# Patient Record
Sex: FEMALE | Race: WHITE | NOT HISPANIC OR LATINO | Employment: FULL TIME | URBAN - METROPOLITAN AREA
[De-identification: names, ages, dates, MRNs, and addresses within clinical notes are randomized per-mention and may not be internally consistent; named-entity substitution may affect disease eponyms.]

---
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
## 2022-01-10 ENCOUNTER — HISTORICAL (OUTPATIENT)
Dept: ADMINISTRATIVE | Facility: HOSPITAL | Age: 22
End: 2022-01-10

## 2022-05-02 NOTE — HISTORICAL [facility]
This is a historical note converted from Alexey. Formatting and pictures may have been removed.  Please reference Alexey for original formatting and attached multimedia. Chief Complaint  3 MONTH F/U LEFT SI ?SCREW FIXATION, AMBULATING WITHOUT ASSISTANCE, NO COMPLAINTS  History of Present Illness  ?  Patient is here today now 3 months status post left sacroiliac screw fixation?for multiple pelvic fractures with unstable disruption of pelvic [place].? She has been ambulating without any assistive devices. ?She is feeling much better. ?She has occasional pain?in her left hip?with stiffness but?this is improving.? She is ready for?return to work.  ?  Review of Systems  ?  Otherwise negative  ?  Physical Exam  Vitals & Measurements  HR:?90(Peripheral)? RR:?20? BP:?124/80?  HT:?154.90?cm? WT:?46.500?kg? BMI:?19.38? LMP:?06/28/2021 00:00 CDT?  Bilateral lower extremities:?No pain with THERESA testing?bilateral lower extremities.? Surgical incisions are well-healed.? Full range of motion of the hip, knee, ankle and digits bilaterally. ?Equal and symmetric pulses?with 5 out of 5 motor strength dorsiflexion plantarflexion of ankle and digits.? No tenderness to palpation across anterior pelvic ring.  Assessment/Plan  Multiple pelvic fractures?S32.82XA  Ordered:  Clinic Follow up, *Est. 10/07/21 3:00:00 CDT, Order for future visit, Multiple pelvic fractures, [facility]  Post-Op follow-up visit [id number] PC, Multiple pelvic fractures, [facility], 07/07/21 14:37:00 CDT  XR Pelvis Minimum 3 Views, Routine, *Est. 10/07/21 3:00:00 CDT, None, Ambulatory, Rad Type, Order for future visit, Multiple pelvic fractures, Not Scheduled, *Est. 10/07/21 3:00:00 CDT  ?  ?  She is doing very well today and I am very happy with her progress. ?We will provide her with a return to work full?duty without restrictions starting on 7/21. ?She can continue full weightbearing bilateral lower extremities. ?I continue to have her work on  strengthening and range of motion exercises to improve her stamina?and gait. ?I will see her back in 3 months?for final x-rays of her pelvic ring.? She is happy with this plan of care and all questions and concerns were addressed.  ?  Referrals  Clinic Follow up, *Est. 10/07/21 3:00:00 CDT, Order for future visit, Multiple pelvic fractures, [facility]   Problem List/Past Medical History  Ongoing  No chronic problems  Historical  No qualifying data  Procedure/Surgical History  ORIF Sacroiliac Joint (.) (04/11/2021)  Reposition Left Pelvic Bone with Internal Fixation Device, Open Approach (04/11/2021)  Reposition Right Pelvic Bone with Internal Fixation Device, Open Approach (04/11/2021)  none   Medications  acetaminophen-oxycodone 325 mg-5 mg oral tablet, 1 tab(s), Oral, q4hr  Albuterol (Eqv-ProAir HFA) 90 mcg/inh inhalation aerosol, 2 puff(s), INH, q6hr  alPRAzolam 0.5 mg oral tablet, 0.5 mg= 1 tab(s), Oral, TID  aspirin 81 mg oral Delayed Release (EC) tablet, 81 mg= 1 tab(s), Oral, BID  azithromycin 500 mg oral tablet, 500 mg= 1 tab(s), Oral, Daily  bisacodyl 10 mg RECTAL suppository, 10 mg= 1 supp, PA (rectal), Daily, PRN  docusate sodium 100 mg oral capsule, 100 mg= 1 cap(s), Oral, BID  ethinyl estradiol-norelgestromin 35 mcg-150 mcg/24 hr transdermal film, extended release, 1 patch(es), TD, As Directed  fludrocortisone 0.1 mg oral tablet, 0.1 mg= 1 tab(s), Oral, Daily  gabapentin 300 mg oral capsule, 300 mg= 1 cap(s), Oral, TID  hydrOXYzine hydrochloride 25 mg oral tablet, 25 mg= 1 tab(s), Oral, QID  JUNEL FE TAB 1/20  Lexapro 10 mg oral tablet, 10 mg= 1 tab(s), Oral, Daily  methocarbamol 500 mg oral tablet, 500 mg= 1 tab(s), Oral, q8hr,? ?Not Taking, Completed Rx  metoprolol succinate 100 mg oral tablet, extended release, 100 mg= 1 tab(s), Oral, Daily  metoprolol succinate 50 mg oral tablet, extended release, 50 mg= 1 tab(s), Oral, Daily,? ?Not taking  metoprolol tartrate 25 mg oral tab, 25 mg= 1 tab(s),  Oral, BID  polyethylene glycol 3350 ( MiraLax ), 17 gm, Oral, Daily  sertraline 25 mg oral tablet, 25 mg= 1 tab(s), Oral, Daily  SUMAtriptan 50 mg oral tablet, 50 mg= 1 tab(s), Oral, BID  VYVANSE CAP 50MG, 50 mg= 1 cap(s), Oral, qAM,? ?Not taking  Allergies  sulfa drugs?(Hives)  Social History  Abuse/Neglect  No, No, Yes, 07/07/2021  Alcohol  Never, 12/17/2018  Tobacco  Never (less than 100 in lifetime), N/A, 07/07/2021  Family History  Family history is negative  Health Maintenance  Health Maintenance  ???Pending?(in the next year)  ??? ??OverDue  ??? ? ? ?Influenza Vaccine due??10/01/20??and every 1??day(s)  ??? ??Due In Future?  ??? ? ? ?Obesity Screening not due until??01/01/22??and every 1??year(s)  ??? ? ? ?Alcohol Misuse Screening not due until??01/02/22??and every 1??year(s)  ??? ? ? ?ADL Screening not due until??04/27/22??and every 1??year(s)  ???Satisfied?(in the past 1 year)  ??? ??Satisfied?  ??? ? ? ?ADL Screening on??04/27/21.??Satisfied by Thad Salas  ??? ? ? ?Alcohol Misuse Screening on??04/27/21.??Satisfied by Thad Salas  ??? ? ? ?Blood Pressure Screening on??07/07/21.??Satisfied by Mary Ann Aguila  ??? ? ? ?Body Mass Index Check on??07/07/21.??Satisfied by Mary Ann Aguila  ??? ? ? ?Depression Screening on??07/07/21.??Satisfied by Mary Ann Aguila  ??? ? ? ?Obesity Screening on??07/07/21.??Satisfied by Mary Ann Aguila  ?  Diagnostic Results  Pelvis 3 views: Hardware intact. ?Alignment maintained.? Fracture is well-healed.

## 2022-05-02 NOTE — HISTORICAL [facility]
This is a historical note converted from Alexey. Formatting and pictures may have been removed.  Please reference Alexey for original formatting and attached multimedia. Chief Complaint  5.5 month f/u left sacroiliac screw fixation for posterior pelvic ring disruption, 04/11/21, FWB normal gait, c/o pain in left hip and numbness that radiates down the left leg  History of Present Illness  Patient is here today for?follow-up evaluation status post left sacroiliac screw fixation.? She states that she is having some pain of the left hip?which sometimes keeps her up at night.? She?is ambulating well without any assistive devices. ?She has no?restrictions in her range of motion?or?activities.? The pain is localized to the left hip and left low back area.  Review of Systems  ?  Constitutional: negative except as stated in HPI  HEENT: negative except as stated in HPI  Respiratory: negative except as stated in HPI  Cardiovascular: negative except as stated in HPI  Gastrointestinal: negative except as stated in HPI  Genitourinary: negative except as stated in HPI  Heme/Lymph: negative except as stated in HPI  Musculoskeletal: negative except as stated in HPI  Integumentary: negative except as stated in HPI  Neurologic: negative except as stated in HPI  ?   All Other ROS_ negative except as stated in HPI  ?  Physical Exam  Vitals & Measurements  HR:?89(Peripheral)? RR:?20? BP:?119/66?  HT:?154.90?cm? WT:?45.200?kg? BMI:?18.84? LMP:?09/10/2021 00:00 CDT?  ?  GEN: Well appearing. Awake, alert and oriented. ?In no distress.  ?  HEENT: NCAT, EOMI  ?  CV: Normal rhythm, regular rate, normal peripheral perfusion  ?  PULM: Unlabored respirations with symmetric chest rise. No SOB noted  ?  ABD: Soft, nontender, nondistended  ?  Integument: Clean and dry, no open wounds or lesions  ?  Left hip:?Surgical incision well-healed.? Pain with?figure-of-four?Fabere test?localized to the left?hip.? No pain on the opposite side.? No significant  pain?with palpation?of the anterior pelvic ring.? Neurovascularly intact distally. ?5 out of 5 motor strength distally.  ?  Assessment/Plan  Multiple pelvic fractures?S32.82XA  Ordered:  Clinic Follow up, *Est. 01/27/22 3:00:00 CST, Order for future visit, Multiple pelvic fractures, [facility]  Office/Outpatient Visit Level 3 Established [id number] PC, Multiple pelvic fractures, [facility], 09/27/21 11:11:00 CDT  XR Pelvis Minimum 3 Views, Routine, *Est. 01/27/22 3:00:00 CST, Trauma, None, Ambulatory, Rad Type, Order for future visit, Multiple pelvic fractures, Not Scheduled, *Est. 01/27/22 3:00:00 CST  ?  ?  She is having some discomfort in the left hip?which may be?due to?her previous sacroiliac screw fixation?though it is difficult to determine based on her examination. ?She is able to walk?without any limp?or discomfort. ?She does have pain with THERESA testing?however she has full range of motion.? She is?5 months out?from a?unstable pelvic ring?and I would like to?give her more time to heal?before we would consider any further intervention.? For now we will plan to do is?have her begin some physical therapy?to work on stretching and range of motion?and strengthening.? If she continues to have pain discomfort we may need to consider removal?of her sacroiliac screws?in the next few months.? She will come back in 4 months for?repeat evaluation?and x-rays.? She is happy with this plan of care today and all questions and concerns were addressed.  ?  Referrals  PT/OT Ambulatory Referral, Specialty: Physical Therapy, Start: 09/27/21 10:48:00 CDT, Evaluate and Treat, Multiple pelvic fractures, 3 X Week  Clinic Follow up, *Est. 01/27/22 3:00:00 CST, Order for future visit, Multiple pelvic fractures, [facility]   Problem List/Past Medical History  Ongoing  No qualifying data  Historical  No qualifying data  Procedure/Surgical History  ORIF Sacroiliac Joint (.) (04/11/2021)  Reposition Left Pelvic Bone with  Internal Fixation Device, Open Approach (04/11/2021)  Reposition Right Pelvic Bone with Internal Fixation Device, Open Approach (04/11/2021)  none   Medications  acetaminophen-oxycodone 325 mg-5 mg oral tablet, 1 tab(s), Oral, q4hr  Albuterol (Eqv-ProAir HFA) 90 mcg/inh inhalation aerosol, 2 puff(s), INH, q6hr  alPRAzolam 0.5 mg oral tablet, 0.5 mg= 1 tab(s), Oral, TID  amoxicillin-clavulanate 875 mg-125 mg oral tablet, 875 mg, Oral, q12hr,? ?Not Taking, Completed Rx  aspirin 81 mg oral Delayed Release (EC) tablet, 81 mg= 1 tab(s), Oral, BID  azithromycin 500 mg oral tablet, 500 mg= 1 tab(s), Oral, Daily  bisacodyl 10 mg RECTAL suppository, 10 mg= 1 supp, MS (rectal), Daily, PRN  docusate sodium 100 mg oral capsule, 100 mg= 1 cap(s), Oral, BID  ethinyl estradiol-norelgestromin 35 mcg-150 mcg/24 hr transdermal film, extended release, 1 patch(es), TD, As Directed  fludrocortisone 0.1 mg oral tablet, 0.1 mg= 1 tab(s), Oral, Daily  gabapentin 300 mg oral capsule, 300 mg= 1 cap(s), Oral, TID  hydrOXYzine hydrochloride 25 mg oral tablet, 25 mg= 1 tab(s), Oral, QID  JUNEL FE TAB 1/20  Lexapro 10 mg oral tablet, 10 mg= 1 tab(s), Oral, Daily  methocarbamol 500 mg oral tablet, 500 mg= 1 tab(s), Oral, q8hr,? ?Not Taking, Completed Rx  metoprolol succinate 100 mg oral tablet, extended release, 100 mg= 1 tab(s), Oral, Daily  metoprolol succinate 50 mg oral tablet, extended release, 50 mg= 1 tab(s), Oral, Daily,? ?Not taking  metoprolol tartrate 25 mg oral tab, 25 mg= 1 tab(s), Oral, BID  Ninjacof oral liquid, 10 mL, Oral, q8hr,? ?Not Taking, Completed Rx  Norco 5 mg-325 mg oral tablet, 1 tab(s), Oral, q6hr, PRN  polyethylene glycol 3350 ( MiraLax ), 17 gm, Oral, Daily  prednisONE 20 mg oral tablet, 20 mg= 1 tab(s), Oral, Daily,? ?Not Taking, Completed Rx  sertraline 25 mg oral tablet, 25 mg= 1 tab(s), Oral, Daily  SUMAtriptan 50 mg oral tablet, 50 mg= 1 tab(s), Oral, BID  VYVANSE CAP 50MG, 50 mg= 1 cap(s), Oral, qAM,? ?Not  taking  Vyvanse 30 mg oral capsule, 30 mg= 1 cap(s), Oral, qAM  Allergies  sulfa drugs?(Hives)  Social History  Abuse/Neglect  No, 09/27/2021  No, No, Yes, 09/16/2021  Alcohol  Never, 09/25/2021  Never, 12/17/2018  Tobacco  Cigars or pipes but not daily within last 30 days, Electronic Device, No, 09/27/2021  Never (less than 100 in lifetime), N/A, 09/16/2021  Never (less than 100 in lifetime), N/A, 07/07/2021  Family History  Family history is negative  Health Maintenance  Health Maintenance  ???Pending?(in the next year)  ??? ??OverDue  ??? ? ? ?Smoking Cessation due??01/01/21??Variable frequency  ??? ??Due?  ??? ? ? ?Tetanus Vaccine due??09/27/21??and every 10??year(s)  ??? ??Due In Future?  ??? ? ? ?Obesity Screening not due until??01/01/22??and every 1??year(s)  ??? ? ? ?Alcohol Misuse Screening not due until??01/02/22??and every 1??year(s)  ??? ? ? ?ADL Screening not due until??04/27/22??and every 1??year(s)  ???Satisfied?(in the past 1 year)  ??? ??Satisfied?  ??? ? ? ?ADL Screening on??04/27/21.??Satisfied by Thad Salas  ??? ? ? ?Alcohol Misuse Screening on??04/27/21.??Satisfied by Thad Salas  ??? ? ? ?Blood Pressure Screening on??09/27/21.??Satisfied by Carlita Gibbons  ??? ? ? ?Body Mass Index Check on??09/27/21.??Satisfied by Carlita Gibbons  ??? ? ? ?Depression Screening on??09/27/21.??Satisfied by Carlita Gibbons  ??? ? ? ?Influenza Vaccine on??09/27/21.??Satisfied by Carlita Gibbons  ??? ? ? ?Obesity Screening on??09/27/21.??Satisfied by Carlita Gibbons  ?  Diagnostic Results  Pelvis 3 views:?Hardware intact. ?Alignment maintained.? Anterior pelvic ring fracture is well-healed.
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
## 2022-05-02 NOTE — HISTORICAL [facility]
This is a historical note converted from Alexey. Formatting and pictures may have been removed.  Please reference Alexey for original formatting and attached multimedia. Chief Complaint  L SACROILIAC SCREW FIXATION FOR POSTERIOR Pelvic ring disruption 4.11.21, pt denies pain, no other changes  History of Present Illness  ?  Here today for follow-up evaluation status post?left?sacroiliac screw placement for?pelvic ring injury.? She has been doing very well. ?She has been compliant with her nonweightbearing status. ?She reports no pain. ?She is eager to begin ambulation.  ?  Review of Systems  ?  Otherwise negative  ?  Physical Exam  Vitals & Measurements  T:?36.7? ?C (Oral)?  HT:?154.90?cm? WT:?46.500?kg? BMI:?19.38? LMP:?05/08/2021 00:00 CDT?  ?  Comes in riding a wheelchair, nonweightbearing bilaterally.  ?  Bilateral lower extremities:?No pain with internal or external rotation of?hips bilaterally.? No tenderness to palpation over the anterior pelvic ring.? Surgical incision of the left hip is well-healed. ?5 out of 5 motor strength distally with palpable DP pulses bilaterally.  ?  Assessment/Plan  1.?Multiple pelvic fractures?S32.82XA  Ordered:  Clinic Follow up, *Est. 07/06/21 3:00:00 CDT, Order for future visit, Multiple pelvic fractures, [facility]  Post-Op follow-up visit [id number] PC, Multiple pelvic fractures, [facility], 05/25/21 9:51:00 CDT  XR Pelvis Minimum 3 Views, Routine, *Est. 07/06/21 3:00:00 CDT, Trauma, None, Ambulatory, Rad Type, Order for future visit, Multiple pelvic fractures, Not Scheduled, *Est. 07/06/21 3:00:00 CDT  ?  ?  She is doing very well today and a very happy with her progress. ?We will have her continue?nonweightbearing for an additional 2 weeks at which time she can begin?ambulating with a walker.? She will begin weaning away from the walker over the subsequent 4 weeks?to the point where she is full weightbearing by?the 3-month gasper. ?She will follow-up with me in 6  weeks repeat x-rays.? She and her grandfather understand and agree with our plan today and all questions and concerns were addressed.  ?  Referrals  Clinic Follow up, *Est. 07/06/21 3:00:00 CDT, Order for future visit, Multiple pelvic fractures, [facility]  Clinic Follow up, *Est. 07/06/21 3:00:00 CDT, Order for future visit, Multiple pelvic fractures, [facility]   Problem List/Past Medical History  Ongoing  No chronic problems  Historical  No qualifying data  Procedure/Surgical History  ORIF Sacroiliac Joint (.) (04/11/2021)  Reposition Left Pelvic Bone with Internal Fixation Device, Open Approach (04/11/2021)  Reposition Right Pelvic Bone with Internal Fixation Device, Open Approach (04/11/2021)  none   Medications  aspirin 81 mg oral Delayed Release (EC) tablet, 81 mg= 1 tab(s), Oral, BID  bisacodyl 10 mg RECTAL suppository, 10 mg= 1 supp, MA (rectal), Daily, PRN  docusate sodium 100 mg oral capsule, 100 mg= 1 cap(s), Oral, BID  fludrocortisone 0.1 mg oral tablet, 0.1 mg= 1 tab(s), Oral, Daily  gabapentin 300 mg oral capsule, 300 mg= 1 cap(s), Oral, TID  hydrOXYzine hydrochloride 25 mg oral tablet, 25 mg= 1 tab(s), Oral, QID  JUNEL FE TAB 1/20  Lexapro 10 mg oral tablet, 10 mg= 1 tab(s), Oral, Daily  metoprolol succinate 50 mg oral tablet, extended release, 50 mg= 1 tab(s), Oral, Daily,? ?Not taking  metoprolol tartrate 25 mg oral tab, 25 mg= 1 tab(s), Oral, BID  polyethylene glycol 3350 ( MiraLax ), 17 gm, Oral, Daily  SUMAtriptan 50 mg oral tablet, 50 mg= 1 tab(s), Oral, BID  VYVANSE CAP 50MG, 50 mg= 1 cap(s), Oral, qAM,? ?Not taking  Allergies  sulfa drugs?(Hives)  Social History  Abuse/Neglect  No, No, Yes, 05/25/2021  Alcohol  Never, 12/17/2018  Tobacco  Never (less than 100 in lifetime), N/A, 05/25/2021  Health Maintenance  Health Maintenance  ???Pending?(in the next year)  ??? ??OverDue  ??? ? ? ?Influenza Vaccine due??10/01/20??and every 1??day(s)  ??? ??Due In Future?  ??? ? ? ?Obesity Screening  not due until??01/01/22??and every 1??year(s)  ??? ? ? ?Alcohol Misuse Screening not due until??01/02/22??and every 1??year(s)  ??? ? ? ?Blood Pressure Screening not due until??04/27/22??and every 1??year(s)  ??? ? ? ?ADL Screening not due until??04/27/22??and every 1??year(s)  ???Satisfied?(in the past 1 year)  ??? ??Satisfied?  ??? ? ? ?ADL Screening on??04/27/21.??Satisfied by Thad Salas  ??? ? ? ?Alcohol Misuse Screening on??04/27/21.??Satisfied by Thad Salas  ??? ? ? ?Blood Pressure Screening on??04/27/21.??Satisfied by Thad Salas  ??? ? ? ?Body Mass Index Check on??05/25/21.??Satisfied by Thad Salas  ??? ? ? ?Depression Screening on??05/25/21.??Satisfied by Thad Salas  ??? ? ? ?Obesity Screening on??05/25/21.??Satisfied by Thad Salas  ?  Diagnostic Results  ?  Pelvis 3 views:?Hardware intact. ?Alignment maintained. ?Evidence of callus formation noted?in?all 4 rami  ?

## 2022-05-18 ENCOUNTER — OFFICE VISIT (OUTPATIENT)
Dept: ORTHOPEDICS | Facility: CLINIC | Age: 22
End: 2022-05-18
Payer: MEDICAID

## 2022-05-18 VITALS
HEART RATE: 89 BPM | SYSTOLIC BLOOD PRESSURE: 133 MMHG | HEIGHT: 61 IN | DIASTOLIC BLOOD PRESSURE: 84 MMHG | BODY MASS INDEX: 17.75 KG/M2 | WEIGHT: 94 LBS

## 2022-05-18 DIAGNOSIS — M70.62 GREATER TROCHANTERIC BURSITIS OF LEFT HIP: Primary | ICD-10-CM

## 2022-05-18 PROCEDURE — 3075F SYST BP GE 130 - 139MM HG: CPT | Mod: CPTII,,, | Performed by: ORTHOPAEDIC SURGERY

## 2022-05-18 PROCEDURE — 99213 PR OFFICE/OUTPT VISIT, EST, LEVL III, 20-29 MIN: ICD-10-PCS | Mod: ,,, | Performed by: ORTHOPAEDIC SURGERY

## 2022-05-18 PROCEDURE — 3079F DIAST BP 80-89 MM HG: CPT | Mod: CPTII,,, | Performed by: ORTHOPAEDIC SURGERY

## 2022-05-18 PROCEDURE — 99213 OFFICE O/P EST LOW 20 MIN: CPT | Mod: ,,, | Performed by: ORTHOPAEDIC SURGERY

## 2022-05-18 PROCEDURE — 1160F RVW MEDS BY RX/DR IN RCRD: CPT | Mod: CPTII,,, | Performed by: ORTHOPAEDIC SURGERY

## 2022-05-18 PROCEDURE — 1160F PR REVIEW ALL MEDS BY PRESCRIBER/CLIN PHARMACIST DOCUMENTED: ICD-10-PCS | Mod: CPTII,,, | Performed by: ORTHOPAEDIC SURGERY

## 2022-05-18 PROCEDURE — 1159F PR MEDICATION LIST DOCUMENTED IN MEDICAL RECORD: ICD-10-PCS | Mod: CPTII,,, | Performed by: ORTHOPAEDIC SURGERY

## 2022-05-18 PROCEDURE — 3008F PR BODY MASS INDEX (BMI) DOCUMENTED: ICD-10-PCS | Mod: CPTII,,, | Performed by: ORTHOPAEDIC SURGERY

## 2022-05-18 PROCEDURE — 3079F PR MOST RECENT DIASTOLIC BLOOD PRESSURE 80-89 MM HG: ICD-10-PCS | Mod: CPTII,,, | Performed by: ORTHOPAEDIC SURGERY

## 2022-05-18 PROCEDURE — 3075F PR MOST RECENT SYSTOLIC BLOOD PRESS GE 130-139MM HG: ICD-10-PCS | Mod: CPTII,,, | Performed by: ORTHOPAEDIC SURGERY

## 2022-05-18 PROCEDURE — 1159F MED LIST DOCD IN RCRD: CPT | Mod: CPTII,,, | Performed by: ORTHOPAEDIC SURGERY

## 2022-05-18 PROCEDURE — 3008F BODY MASS INDEX DOCD: CPT | Mod: CPTII,,, | Performed by: ORTHOPAEDIC SURGERY

## 2022-05-18 RX ORDER — MELOXICAM 15 MG/1
1 TABLET ORAL DAILY
COMMUNITY
Start: 2022-05-14 | End: 2022-06-13

## 2022-05-18 RX ORDER — PREDNISONE 20 MG/1
20 TABLET ORAL
COMMUNITY
Start: 2021-09-27 | End: 2022-05-21

## 2022-05-18 NOTE — PROGRESS NOTES
Subjective:       Patient ID: Tasneem Brasher is a 21 y.o. female.    Chief Complaint   Patient presents with    Pelvis - Injury     F/U SI screw left pelvic ring fx, SX 04/11/21,Tingling in legs    Left Hip - Pain     [facility] 05/16/22        Patient is here today for evaluation of pain in her left hip.  She was seen 2 days ago in the emergency department at [facility] diagnosed with bursitis left greater trochanter was given steroid injection and a Medrol Dosepak.  She states that she has had some improvement in the discomfort.  It was point tenderness over the greater trochanter.  She does endorse tingling in bilateral lower extremities that is off and on and she thinks may be related to herniated disc.  She sees a spine surgeon or back injury.  No new trauma.    Injury  Pertinent negatives include no abdominal pain, chest pain, chills, congestion, coughing, fever, nausea, neck pain, numbness or vomiting.   Pain  Pertinent negatives include no abdominal pain, chest pain, chills, congestion, coughing, fever, nausea, neck pain, numbness or vomiting.       Review of Systems   Constitutional: Negative for chills, fever and malaise/fatigue.   HENT: Negative for congestion and hearing loss.    Eyes: Negative for visual disturbance.   Cardiovascular: Negative for chest pain and syncope.   Respiratory: Negative for cough and shortness of breath.    Hematologic/Lymphatic: Does not bruise/bleed easily.   Skin: Negative for color change and suspicious lesions.   Musculoskeletal: Negative for falls and neck pain.   Gastrointestinal: Negative for abdominal pain, nausea and vomiting.   Genitourinary: Negative for dysuria and hematuria.   Neurological: Negative for numbness and sensory change.   Psychiatric/Behavioral: Negative for altered mental status. The patient is not nervous/anxious.         Current Outpatient Medications on File Prior to Visit   Medication Sig  "Dispense Refill    meloxicam (MOBIC) 15 MG tablet Take 1 tablet by mouth once daily.      predniSONE (DELTASONE) 20 MG tablet Take 20 mg by mouth.       No current facility-administered medications on file prior to visit.          Objective:      /84   Pulse 89   Ht 5' 1" (1.549 m)   Wt 42.6 kg (94 lb)   BMI 17.76 kg/m²   Physical Exam  Musculoskeletal:      Comments: Left lower extremity:  She is point tender over the greater trochanter.  She has pain in this region when lying on her right side with resisted abduction of the left hip.  No ITB band snapping is noted with internal external rotation.  Surgical incision of the left hip is well healed.  She is neurovascularly intact bilateral lower extremities.        Body mass index is 17.76 kg/m².    Radiology: none        Assessment:         No diagnosis found.        Plan:       It appears she has trochanteric bursitis left hip that is improving following steroid injection 2 days ago.  I encouraged her to use ice and anti-inflammatories.  Continue to give it more time for the steroids to work.  She can use topical anti-inflammatories as it is a point tender region and I suggested Voltaren gel.  She will continue to follow-up with her spine surgeon for her low back injury.  Return on an as-needed basis should any new issues arise in the future.  She is happy with this plan of care and all questions and concerns were addressed.    Jean-Pierre Shankar MD  Orthopedic Trauma  [facility]      Follow up if symptoms worsen or fail to improve.    There are no diagnoses linked to this encounter.          No orders of the defined types were placed in this encounter.      No future appointments.                "

## 2022-06-21 ENCOUNTER — HOSPITAL ENCOUNTER (EMERGENCY)
Facility: HOSPITAL | Age: 22
Discharge: HOME | End: 2022-06-22
Attending: FAMILY MEDICINE
Payer: MEDICAID

## 2022-06-21 VITALS
SYSTOLIC BLOOD PRESSURE: 128 MMHG | WEIGHT: 97.19 LBS | HEART RATE: 77 BPM | HEIGHT: 60 IN | OXYGEN SATURATION: 100 % | DIASTOLIC BLOOD PRESSURE: 71 MMHG | RESPIRATION RATE: 16 BRPM | TEMPERATURE: 99 F | BODY MASS INDEX: 19.08 KG/M2

## 2022-06-21 DIAGNOSIS — O21.9 NAUSEA AND VOMITING IN PREGNANCY: Primary | ICD-10-CM

## 2022-06-21 LAB
ALBUMIN SERPL-MCNC: 4.7 GM/DL (ref 3.5–5)
ALBUMIN/GLOB SERPL: 1.5 RATIO (ref 1.1–2)
ALP SERPL-CCNC: 63 UNIT/L (ref 40–150)
ALT SERPL-CCNC: 9 UNIT/L (ref 0–55)
AMPHET UR QL SCN: NEGATIVE
APPEARANCE UR: CLEAR
AST SERPL-CCNC: 16 UNIT/L (ref 5–34)
B-HCG SERPL QL: POSITIVE
BACTERIA #/AREA URNS AUTO: ABNORMAL /HPF
BARBITURATE SCN PRESENT UR: NEGATIVE
BASOPHILS # BLD AUTO: 0.04 X10(3)/MCL (ref 0–0.2)
BASOPHILS NFR BLD AUTO: 0.5 %
BENZODIAZ UR QL SCN: NEGATIVE
BILIRUB UR QL STRIP.AUTO: NEGATIVE MG/DL
BILIRUBIN DIRECT+TOT PNL SERPL-MCNC: 1.6 MG/DL
BUN SERPL-MCNC: 7.8 MG/DL (ref 7–18.7)
CALCIUM SERPL-MCNC: 10 MG/DL (ref 8.4–10.2)
CANNABINOIDS UR QL SCN: NEGATIVE
CHLORIDE SERPL-SCNC: 106 MMOL/L (ref 98–107)
CO2 SERPL-SCNC: 18 MMOL/L (ref 22–29)
COCAINE UR QL SCN: NEGATIVE
COLOR UR AUTO: ABNORMAL
CREAT SERPL-MCNC: 0.66 MG/DL (ref 0.55–1.02)
EOSINOPHIL # BLD AUTO: 0.02 X10(3)/MCL (ref 0–0.9)
EOSINOPHIL NFR BLD AUTO: 0.3 %
ERYTHROCYTE [DISTWIDTH] IN BLOOD BY AUTOMATED COUNT: 12.4 % (ref 11.5–17)
GLOBULIN SER-MCNC: 3.2 GM/DL (ref 2.4–3.5)
GLUCOSE SERPL-MCNC: 92 MG/DL (ref 74–100)
GLUCOSE UR QL STRIP.AUTO: NEGATIVE MG/DL
HCT VFR BLD AUTO: 34.7 % (ref 37–47)
HGB BLD-MCNC: 11.8 GM/DL (ref 12–16)
IMM GRANULOCYTES # BLD AUTO: 0.03 X10(3)/MCL (ref 0–0.02)
IMM GRANULOCYTES NFR BLD AUTO: 0.4 % (ref 0–0.43)
KETONES UR QL STRIP.AUTO: 40 MG/DL
LEUKOCYTE ESTERASE UR QL STRIP.AUTO: ABNORMAL UNIT/L
LIPASE SERPL-CCNC: 31 U/L
LYMPHOCYTES # BLD AUTO: 1.71 X10(3)/MCL (ref 0.6–4.6)
LYMPHOCYTES NFR BLD AUTO: 22.4 %
MCH RBC QN AUTO: 29.3 PG (ref 27–31)
MCHC RBC AUTO-ENTMCNC: 34 MG/DL (ref 33–36)
MCV RBC AUTO: 86.1 FL (ref 80–94)
MDMA UR QL SCN: NEGATIVE
MONOCYTES # BLD AUTO: 0.57 X10(3)/MCL (ref 0.1–1.3)
MONOCYTES NFR BLD AUTO: 7.5 %
NEUTROPHILS # BLD AUTO: 5.3 X10(3)/MCL (ref 2.1–9.2)
NEUTROPHILS NFR BLD AUTO: 68.9 %
NITRITE UR QL STRIP.AUTO: NEGATIVE
NRBC BLD AUTO-RTO: 0 %
OPIATES UR QL SCN: NEGATIVE
PCP UR QL: NEGATIVE
PH UR STRIP.AUTO: 6 [PH]
PH UR: 6 [PH] (ref 3–11)
PLATELET # BLD AUTO: 237 X10(3)/MCL (ref 130–400)
PMV BLD AUTO: 12.2 FL (ref 9.4–12.4)
POTASSIUM SERPL-SCNC: 3.6 MMOL/L (ref 3.5–5.1)
PROT SERPL-MCNC: 7.9 GM/DL (ref 6.4–8.3)
PROT UR QL STRIP.AUTO: ABNORMAL MG/DL
RBC # BLD AUTO: 4.03 X10(6)/MCL (ref 4.2–5.4)
RBC #/AREA URNS AUTO: ABNORMAL /HPF
RBC UR QL AUTO: NEGATIVE UNIT/L
SODIUM SERPL-SCNC: 139 MMOL/L (ref 136–145)
SP GR UR STRIP.AUTO: 1.02
SPECIFIC GRAVITY, URINE AUTO (.000) (OHS): 1.02 (ref 1–1.03)
SQUAMOUS #/AREA URNS AUTO: ABNORMAL /HPF
UROBILINOGEN UR STRIP-ACNC: 0.2 MG/DL
WBC # SPEC AUTO: 7.6 X10(3)/MCL (ref 4.5–11.5)
WBC #/AREA URNS AUTO: ABNORMAL /HPF

## 2022-06-21 PROCEDURE — 83690 ASSAY OF LIPASE: CPT | Performed by: FAMILY MEDICINE

## 2022-06-21 PROCEDURE — 96361 HYDRATE IV INFUSION ADD-ON: CPT

## 2022-06-21 PROCEDURE — 81025 URINE PREGNANCY TEST: CPT | Performed by: FAMILY MEDICINE

## 2022-06-21 PROCEDURE — 63600175 PHARM REV CODE 636 W HCPCS: Performed by: FAMILY MEDICINE

## 2022-06-21 PROCEDURE — 25000003 PHARM REV CODE 250: Performed by: FAMILY MEDICINE

## 2022-06-21 PROCEDURE — 81001 URINALYSIS AUTO W/SCOPE: CPT | Mod: 59 | Performed by: FAMILY MEDICINE

## 2022-06-21 PROCEDURE — 82040 ASSAY OF SERUM ALBUMIN: CPT | Performed by: FAMILY MEDICINE

## 2022-06-21 PROCEDURE — 99284 EMERGENCY DEPT VISIT MOD MDM: CPT | Mod: 25

## 2022-06-21 PROCEDURE — 85025 COMPLETE CBC W/AUTO DIFF WBC: CPT | Performed by: FAMILY MEDICINE

## 2022-06-21 PROCEDURE — 96374 THER/PROPH/DIAG INJ IV PUSH: CPT

## 2022-06-21 PROCEDURE — 80053 COMPREHEN METABOLIC PANEL: CPT | Performed by: FAMILY MEDICINE

## 2022-06-21 PROCEDURE — 36415 COLL VENOUS BLD VENIPUNCTURE: CPT | Performed by: FAMILY MEDICINE

## 2022-06-21 PROCEDURE — 80307 DRUG TEST PRSMV CHEM ANLYZR: CPT | Performed by: FAMILY MEDICINE

## 2022-06-21 RX ORDER — ACETAMINOPHEN 325 MG/1
650 TABLET ORAL
Status: COMPLETED | OUTPATIENT
Start: 2022-06-21 | End: 2022-06-21

## 2022-06-21 RX ORDER — METOCLOPRAMIDE HYDROCHLORIDE 5 MG/ML
10 INJECTION INTRAMUSCULAR; INTRAVENOUS
Status: COMPLETED | OUTPATIENT
Start: 2022-06-21 | End: 2022-06-21

## 2022-06-21 RX ORDER — SODIUM CHLORIDE 9 MG/ML
1000 INJECTION, SOLUTION INTRAVENOUS
Status: COMPLETED | OUTPATIENT
Start: 2022-06-21 | End: 2022-06-21

## 2022-06-21 RX ORDER — METOCLOPRAMIDE HYDROCHLORIDE 5 MG/ML
10 INJECTION INTRAMUSCULAR; INTRAVENOUS
Status: DISCONTINUED | OUTPATIENT
Start: 2022-06-21 | End: 2022-06-21

## 2022-06-21 RX ADMIN — SODIUM CHLORIDE 1000 ML: 9 INJECTION, SOLUTION INTRAVENOUS at 10:06

## 2022-06-21 RX ADMIN — ACETAMINOPHEN 650 MG: 325 TABLET, FILM COATED ORAL at 10:06

## 2022-06-21 RX ADMIN — METOCLOPRAMIDE 10 MG: 5 INJECTION, SOLUTION INTRAMUSCULAR; INTRAVENOUS at 10:06

## 2022-06-21 NOTE — LETTER
Patient: Tasneem Brasher  YOB: 2000  Date: 6/21/2022 Time: 11:59 PM  Location: [facility]  Emergency Dept    Leaving the Hospital Against Medical Advice    Chart #:[id number]    This will certify that I, the undersigned,    ______________________________________________________________________    A patient in the above named medical center, having requested discharge and removal from the medical center against the advice of my attending physician(s), hereby release [facility], its physicians, officers and employees, severally and individually, from any and all liability of any nature whatsoever for any injury or harm or complication of any kind that may result directly or indirectly, by reason of my terminating my stay as a patient at [facility] Emergency Dept and my departure from [facility], and hereby waive any and all rights of action I may now have or later acquire as a result of my voluntary departure from [facility] and the termination of my stay as a patient therein.    This release is made with the full knowledge of the danger that may result from the action which I am taking.      Date:_______________________                         ___________________________                                                                                    Patient/Legal Representative    Witness:        ____________________________                          ___________________________  Nurse                                                                        Physician

## 2022-06-21 NOTE — LETTER
Patient: Tasneem Brasher  YOB: 2000  Date: 6/21/2022 Time: 11:58 PM  Location: [facility]  Emergency Dept    Leaving the Hospital Against Medical Advice    Chart #:[id number]    This will certify that I, the undersigned,    ______________________________________________________________________    A patient in the above named medical center, having requested discharge and removal from the medical center against the advice of my attending physician(s), hereby release [facility], its physicians, officers and employees, severally and individually, from any and all liability of any nature whatsoever for any injury or harm or complication of any kind that may result directly or indirectly, by reason of my terminating my stay as a patient at [facility] Emergency Dept and my departure from [facility], and hereby waive any and all rights of action I may now have or later acquire as a result of my voluntary departure from [facility] and the termination of my stay as a patient therein.    This release is made with the full knowledge of the danger that may result from the action which I am taking.      Date:_______________________                         ___________________________                                                                                    Patient/Legal Representative    Witness:        ____________________________                          ___________________________  Nurse                                                                        Physician

## 2022-06-22 LAB — B-HCG FREE SERPL-ACNC: ABNORMAL MIU/ML

## 2022-06-22 RX ORDER — METOCLOPRAMIDE 10 MG/1
10 TABLET ORAL EVERY 6 HOURS
Qty: 20 TABLET | Refills: 0 | Status: SHIPPED | OUTPATIENT
Start: 2022-06-22

## 2022-06-22 RX ORDER — B-COMPLEX WITH VITAMIN C
1 TABLET ORAL DAILY
Qty: 30 TABLET | Refills: 0 | Status: SHIPPED | OUTPATIENT
Start: 2022-06-22 | End: 2022-07-22

## 2022-06-22 NOTE — ED PROVIDER NOTES
Encounter Date: 2022       History     Chief Complaint   Patient presents with    Emesis During Pregnancy     21-year-old  with pregnancy estimated at 8 weeks gestational age by LMP (2022) presents with nausea and vomiting for the past 1 day.  Patient denies abdominal pain, vaginal bleeding, vaginal discharge.  Denies dysuria or hematuria.  She called her OBGYN today to arrange follow-up but they did not return her phone call.  Patient denies fever or sick contacts.  She has not followed up with OBGYN for this pregnancy.  No other complaints.        Review of patient's allergies indicates:   Allergen Reactions    Sulfa (sulfonamide antibiotics)      Other reaction(s): Hives     No past medical history on file.  No past surgical history on file.  No family history on file.  Social History     Tobacco Use    Smoking status: Current Every Day Smoker     Types: Vaping with nicotine    Smokeless tobacco: Never Used     Review of Systems   Constitutional: Negative.    HENT: Negative.    Eyes: Negative.    Respiratory: Negative.    Cardiovascular: Negative.    Gastrointestinal: Positive for nausea and vomiting.   Endocrine: Negative.    Genitourinary: Negative.    Musculoskeletal: Negative.    Skin: Negative.    Allergic/Immunologic: Negative.    Neurological: Negative.    Hematological: Negative.    Psychiatric/Behavioral: Negative.        Physical Exam     Initial Vitals [22]   BP Pulse Resp Temp SpO2   133/77 94 18 99 °F (37.2 °C) 100 %      MAP       --         Physical Exam    Nursing note and vitals reviewed.  Constitutional: She appears well-developed and well-nourished.   HENT:   Head: Normocephalic and atraumatic.   Eyes: Conjunctivae and EOM are normal. Pupils are equal, round, and reactive to light.   Neck: Neck supple.   Normal range of motion.  Cardiovascular: Normal rate and regular rhythm.   Pulmonary/Chest: Breath sounds normal.   Abdominal: Abdomen is soft. Bowel sounds are  normal. She exhibits no distension. There is no abdominal tenderness. There is no rebound and no guarding.   Musculoskeletal:         General: Normal range of motion.      Cervical back: Normal range of motion and neck supple.     Neurological: She is alert and oriented to person, place, and time. GCS score is 15. GCS eye subscore is 4. GCS verbal subscore is 5. GCS motor subscore is 6.   Skin: Skin is warm. Capillary refill takes less than 2 seconds.   Psychiatric: She has a normal mood and affect.         ED Course   Procedures  Labs Reviewed   URINALYSIS, REFLEX TO URINE CULTURE - Abnormal; Notable for the following components:       Result Value    Color, UA Straw (*)     Protein, UA Trace (*)     Ketones, UA 40  (*)     Leukocyte Esterase, UA Trace (*)     All other components within normal limits   COMPREHENSIVE METABOLIC PANEL - Abnormal; Notable for the following components:    Carbon Dioxide 18 (*)     Bilirubin Total 1.6 (*)     All other components within normal limits   PREGNANCY TEST, URINE RAPID - Abnormal; Notable for the following components:    Beta hCG Qualitative, Urine Positive (*)     All other components within normal limits   CBC WITH DIFFERENTIAL - Abnormal; Notable for the following components:    RBC 4.03 (*)     Hgb 11.8 (*)     Hct 34.7 (*)     IG# 0.03 (*)     All other components within normal limits   URINALYSIS, MICROSCOPIC - Abnormal; Notable for the following components:    Bacteria, UA Many (*)     Squamous Epithelial Cells, UA Few (*)     All other components within normal limits   HCG, QUANTITATIVE - Abnormal; Notable for the following components:    Beta Human Chorionic Gonadotropin Quantitative 36,777.63 (*)     All other components within normal limits   DRUG SCREEN, URINE (BEAKER) - Normal    Narrative:     Cut off concentrations:    Amphetamines - 1000 ng/ml  Barbiturates - 200 ng/ml  Benzodiazepine - 200 ng/ml  Cannabinoids (THC) - 50 ng/ml  Cocaine - 300 ng/ml  Fentanyl -  1.0 ng/ml  MDMA - 500 ng/ml  Opiates - 300 ng/ml   Phencyclidine (PCP) - 25 ng/ml    Specimen submitted for drug analysis and tested for pH and specific gravity in order to evaluate sample integrity. Suspect tampering if specific gravity is <1.003 and/or pH is not within the range of 4.5 - 8.0  False negatives may result form substances such as bleach added to urine.  False positives may result for the presence of a substance with similar chemical structure to the drug or its metabolite.    This test provides only a PRELIMINARY analytical test result. A more specific alternate chemical method must be used in order to obtain a confirmed analytical result. Gas chromatography/mass spectrometry (GC/MS) is the preferred confirmatory method. Other chemical confirmation methods are available. Clinical consideration and professional judgement should be applied to any drug of abuse test result, particularly when preliminary positive results are used.    Positive results will be confirmed only at the physicians request. Unconfirmed screening results are to be used only for medical purposes (treatment).        LIPASE - Normal   CBC W/ AUTO DIFFERENTIAL    Narrative:     The following orders were created for panel order CBC auto differential.  Procedure                               Abnormality         Status                     ---------                               -----------         ------                     CBC with Differential[[id number]]        Abnormal            Final result                 Please view results for these tests on the individual orders.          Imaging Results    None          Medications   0.9%  NaCl infusion (0 mLs Intravenous Stopped 6/21/22 2310)   metoclopramide HCl injection 10 mg (10 mg Intravenous Given 6/21/22 2227)   acetaminophen tablet 650 mg (650 mg Oral Given 6/21/22 2239)                 ED Course as of 06/22/22 0439   Wed Jun 22, 2022   0006 Patient requesting discharge home.  She does  not want to wait for the results of her beta hCG quant.  She declined ultrasound.  Explained that I cannot rule out ectopic pregnancy and further pathology without these tests and her symptoms may get significantly worse.  Patient again declined.  Risks benefits discussed, patient voiced understanding. [AG]      ED Course User Index  [AG] Pablo Chambers MD             Clinical Impression:   Final diagnoses:  [O21.9] Nausea and vomiting in pregnancy (Primary)          ED Disposition Condition    Discharge Stable        ED Prescriptions     Medication Sig Dispense Start Date End Date Auth. Provider    prenatal vit no.130-iron-folic (PRENATAL VITAMIN) 27 mg iron- 800 mcg Tab Take 1 tablet by mouth once daily at 6am. 30 tablet 6/22/2022 7/22/2022 Pablo Chambers MD    metoclopramide HCl (REGLAN) 10 MG tablet Take 1 tablet (10 mg total) by mouth every 6 (six) hours. 20 tablet 6/22/2022  Pablo Chambers MD        Follow-up Information     Follow up With Specialties Details Why Contact Info    Your PCP  Today             Pablo Chambers MD  06/22/22 [id number]

## 2022-06-22 NOTE — ED NOTES
LMP 5/8/22, pt reports only eating one piece of a quesadilla yesterday & one cracker today,  Pt is holding down some liquids, she asked to go to the restroom upon arrival to room #12, sign other at bs. Mucous membranes are moist, pt ambulated to restroom & back to ed bed w/o diff.

## 2024-12-25 ENCOUNTER — HOSPITAL ENCOUNTER (EMERGENCY)
Facility: HOSPITAL | Age: 24
Discharge: HOME | End: 2024-12-25
Attending: EMERGENCY MEDICINE
Payer: MEDICAID

## 2024-12-25 VITALS
SYSTOLIC BLOOD PRESSURE: 118 MMHG | HEART RATE: 106 BPM | TEMPERATURE: 99 F | OXYGEN SATURATION: 97 % | RESPIRATION RATE: 18 BRPM | DIASTOLIC BLOOD PRESSURE: 61 MMHG

## 2024-12-25 DIAGNOSIS — J10.1 INFLUENZA A: Primary | ICD-10-CM

## 2024-12-25 LAB
FLUAV AG UPPER RESP QL IA.RAPID: DETECTED
FLUBV AG UPPER RESP QL IA.RAPID: NOT DETECTED
RSV A 5' UTR RNA NPH QL NAA+PROBE: NOT DETECTED
SARS-COV-2 RNA RESP QL NAA+PROBE: NOT DETECTED

## 2024-12-25 PROCEDURE — 99283 EMERGENCY DEPT VISIT LOW MDM: CPT

## 2024-12-25 PROCEDURE — 0241U COVID/RSV/FLU A&B PCR: CPT | Performed by: EMERGENCY MEDICINE

## 2024-12-25 RX ORDER — OSELTAMIVIR PHOSPHATE 75 MG/1
75 CAPSULE ORAL 2 TIMES DAILY
Qty: 10 CAPSULE | Refills: 0 | Status: SHIPPED | OUTPATIENT
Start: 2024-12-25 | End: 2024-12-30

## 2024-12-25 RX ORDER — LORATADINE 10 MG/1
10 TABLET ORAL DAILY
Qty: 30 TABLET | Refills: 0 | Status: SHIPPED | OUTPATIENT
Start: 2024-12-25 | End: 2025-01-24

## 2024-12-26 NOTE — ED PROVIDER NOTES
Encounter Date: 12/25/2024       History     Chief Complaint   Patient presents with    Chills     Reports cough bodyaches and chills; that began this am;      The patient is a 24 y.o. female who presents to the Emergency Department with a chief complaint of generalized body aches. Symptoms began today and have been constant since onset. Her pain is currently rated as a 5/10 in severity and described as aching with no radiation. Associated symptoms include cough and runny nose. Symptoms are aggravated with nothing and there are no alleviating factors. The patient denies chest pain or SOB. She reports taking nothing prior to arrival with no relief of symptoms. No other reported symptoms at this time.      The history is provided by the patient. No  was used.   Cough  This is a new problem. The current episode started today. The problem occurs constantly. The cough is Non-productive. There has been no fever. Associated symptoms include rhinorrhea and sore throat. Pertinent negatives include no chest pain, no chills, no shortness of breath and no wheezing. She has tried nothing for the symptoms. The treatment provided no relief.     Review of patient's allergies indicates:   Allergen Reactions    Sulfa (sulfonamide antibiotics)      Other reaction(s): Hives     History reviewed. No pertinent past medical history.  History reviewed. No pertinent surgical history.  No family history on file.  Social History     Tobacco Use    Smoking status: Every Day     Types: Vaping with nicotine    Smokeless tobacco: Never     Review of Systems   Constitutional:  Negative for chills.   HENT:  Positive for congestion, rhinorrhea and sore throat.    Respiratory:  Positive for cough. Negative for shortness of breath and wheezing.    Cardiovascular:  Negative for chest pain.   Gastrointestinal:  Negative for nausea.   Genitourinary:  Negative for dysuria.   Musculoskeletal:  Negative for back pain.   Skin:  Negative  for rash.   Neurological:  Negative for weakness.   Hematological:  Does not bruise/bleed easily.   All other systems reviewed and are negative.      Physical Exam     Initial Vitals [12/25/24 1944]   BP Pulse Resp Temp SpO2   118/61 106 18 98.7 °F (37.1 °C) 97 %      MAP       --         Physical Exam    Nursing note and vitals reviewed.  Constitutional: She appears well-developed and well-nourished.   HENT:   Head: Normocephalic.   Right Ear: Hearing and tympanic membrane normal.   Left Ear: Hearing and tympanic membrane normal.   Nose: Rhinorrhea present. Mouth/Throat: Uvula is midline, oropharynx is clear and moist and mucous membranes are normal.   Eyes: Conjunctivae and EOM are normal. Pupils are equal, round, and reactive to light.   Cardiovascular:  Normal rate, regular rhythm, normal heart sounds and normal pulses.           Pulmonary/Chest: Effort normal and breath sounds normal.   Abdominal: Abdomen is soft. Bowel sounds are normal. There is no abdominal tenderness.     Lymphadenopathy:     She has no cervical adenopathy.   Neurological: She is alert. GCS eye subscore is 4. GCS verbal subscore is 5. GCS motor subscore is 6.   Skin: Skin is warm, dry and intact. Capillary refill takes less than 2 seconds.         ED Course   Procedures  Labs Reviewed   COVID/RSV/FLU A&B PCR - Abnormal       Result Value    Influenza A PCR Detected (*)     Influenza B PCR Not Detected      Respiratory Syncytial Virus PCR Not Detected      SARS-CoV-2 PCR Not Detected      Narrative:     The Xpert Xpress SARS-CoV-2/FLU/RSV plus is a rapid, multiplexed real-time PCR test intended for the simultaneous qualitative detection and differentiation of SARS-CoV-2, Influenza A, Influenza B, and respiratory syncytial virus (RSV) viral RNA in either nasopharyngeal swab or nasal swab specimens.                Imaging Results    None          Medications - No data to display  Medical Decision Making  The patient is a 24 y.o. female who  presents to the Emergency Department with a chief complaint of generalized body aches. Symptoms began today and have been constant since onset. Her pain is currently rated as a 5/10 in severity and described as aching with no radiation. Associated symptoms include cough and runny nose. Symptoms are aggravated with nothing and there are no alleviating factors. The patient denies chest pain or SOB. She reports taking nothing prior to arrival with no relief of symptoms. No other reported symptoms at this time.      Judging by the patient's chief complaint and pertinent history, the patient has the following possible differential diagnoses, including but not limited to the following.  Some of these are deemed to be lower likelihood and some more likely based on my physical exam and history combined with possible lab work and/or imaging studies.   Please see the pertinent studies, and refer to the HPI.  Some of these diagnoses will take further evaluation to fully rule out, perhaps as an outpatient and the patient was encouraged to follow up when discharged for more comprehensive evaluation.    Viral URI with cough, COVID, influenza, bronchitis, sinusitis    Problems Addressed:  Influenza A: acute illness or injury    Amount and/or Complexity of Data Reviewed  Labs:  Decision-making details documented in ED Course.    Risk  OTC drugs.  Prescription drug management.               ED Course as of 12/25/24 2107   Wed Dec 25, 2024   2047 Influenza A, Molecular(!): Detected [LM]   2105 I discussed results in detail with the patient including follow up.  She is well-appearing.  She is amenable to plan and ready for discharge home.  She was given strict ER return precautions [LM]      ED Course User Index  [LM] Holden Daniels, NP                           Clinical Impression:  Final diagnoses:  [J10.1] Influenza A (Primary)          ED Disposition Condition    Discharge Stable          ED Prescriptions       Medication Sig  Dispense Start Date End Date Auth. Provider    oseltamivir (TAMIFLU) 75 MG capsule Take 1 capsule (75 mg total) by mouth 2 (two) times daily. for 5 days 10 capsule 12/25/2024 12/30/2024 Holden Daniels NP    loratadine (CLARITIN) 10 mg tablet Take 1 tablet (10 mg total) by mouth once daily. 30 tablet 12/25/2024 1/24/2025 Holden Daniels NP          Follow-up Information       Follow up With Specialties Details Why Contact Info    Please contact [phone] to establish care with a primary care provider  Schedule an appointment as soon as possible for a visit                Holden Daniels NP  12/25/24 0199

## 2025-05-23 DIAGNOSIS — H66.011 ACUTE SUPPURATIVE OTITIS MEDIA OF RIGHT EAR WITH SPONTANEOUS RUPTURE OF TYMPANIC MEMBRANE, RECURRENCE NOT SPECIFIED: ICD-10-CM

## 2025-05-23 DIAGNOSIS — H91.91 DECREASED HEARING OF RIGHT EAR: ICD-10-CM

## 2025-05-23 DIAGNOSIS — H72.91 PERFORATION OF EAR DRUM, RIGHT: Primary | ICD-10-CM
